# Patient Record
Sex: FEMALE | Race: WHITE | HISPANIC OR LATINO | URBAN - METROPOLITAN AREA
[De-identification: names, ages, dates, MRNs, and addresses within clinical notes are randomized per-mention and may not be internally consistent; named-entity substitution may affect disease eponyms.]

---

## 2023-03-15 ENCOUNTER — APPOINTMENT (RX ONLY)
Dept: URBAN - METROPOLITAN AREA CLINIC 114 | Facility: CLINIC | Age: 43
Setting detail: DERMATOLOGY
End: 2023-03-15

## 2023-03-15 DIAGNOSIS — L81.1 CHLOASMA: ICD-10-CM

## 2023-03-15 DIAGNOSIS — D22 MELANOCYTIC NEVI: ICD-10-CM

## 2023-03-15 DIAGNOSIS — L81.4 OTHER MELANIN HYPERPIGMENTATION: ICD-10-CM

## 2023-03-15 PROBLEM — D22.39 MELANOCYTIC NEVI OF OTHER PARTS OF FACE: Status: ACTIVE | Noted: 2023-03-15

## 2023-03-15 PROCEDURE — 99204 OFFICE O/P NEW MOD 45 MIN: CPT

## 2023-03-15 PROCEDURE — ? PRESCRIPTION

## 2023-03-15 PROCEDURE — ? PRESCRIPTION MEDICATION MANAGEMENT

## 2023-03-15 PROCEDURE — ? COUNSELING

## 2023-03-15 PROCEDURE — ? DEFER

## 2023-03-15 RX ORDER — HYDROQUINONE 6% 6 G/100G
1 EMULSION TOPICAL QD
Qty: 1 | Refills: 2 | COMMUNITY
Start: 2023-03-15

## 2023-03-15 RX ADMIN — HYDROQUINONE 6% 1: 6 EMULSION TOPICAL at 00:00

## 2023-03-15 ASSESSMENT — LOCATION DETAILED DESCRIPTION DERM
LOCATION DETAILED: LEFT CENTRAL MALAR CHEEK
LOCATION DETAILED: LEFT INFERIOR CENTRAL MALAR CHEEK
LOCATION DETAILED: RIGHT CENTRAL MALAR CHEEK
LOCATION DETAILED: RIGHT MEDIAL FOREHEAD
LOCATION DETAILED: RIGHT INFERIOR MEDIAL FOREHEAD
LOCATION DETAILED: RIGHT INFERIOR CENTRAL MALAR CHEEK

## 2023-03-15 ASSESSMENT — LOCATION SIMPLE DESCRIPTION DERM
LOCATION SIMPLE: RIGHT FOREHEAD
LOCATION SIMPLE: LEFT CHEEK
LOCATION SIMPLE: RIGHT CHEEK

## 2023-03-15 ASSESSMENT — LOCATION ZONE DERM: LOCATION ZONE: FACE

## 2023-03-15 NOTE — PROCEDURE: PRESCRIPTION MEDICATION MANAGEMENT
Detail Level: Detailed
Render In Strict Bullet Format?: No
Initiate Treatment: hydroquinone 6 % topical emulsion\\nSig: Apply once daily to the affected area of the face.  X 3 months

## 2023-03-15 NOTE — PROCEDURE: DEFER
Detail Level: Detailed
Size Of Lesion In Cm (Optional): 0
Scheduling Instructions (Optional): Referring out for consultation with Yahir Flores
Introduction Text (Please End With A Colon): Mark Choudhury

## 2023-03-16 ENCOUNTER — APPOINTMENT (RX ONLY)
Dept: URBAN - METROPOLITAN AREA CLINIC 333 | Facility: CLINIC | Age: 43
Setting detail: DERMATOLOGY
End: 2023-03-16

## 2023-03-16 DIAGNOSIS — Z41.9 ENCOUNTER FOR PROCEDURE FOR PURPOSES OTHER THAN REMEDYING HEALTH STATE, UNSPECIFIED: ICD-10-CM

## 2023-03-16 DIAGNOSIS — L81.1 CHLOASMA: ICD-10-CM | Status: INADEQUATELY CONTROLLED

## 2023-03-16 PROCEDURE — ? ADDITIONAL NOTES

## 2023-03-16 PROCEDURE — 99203 OFFICE O/P NEW LOW 30 MIN: CPT

## 2023-03-16 PROCEDURE — ? OTHER (COSMETIC)

## 2023-03-16 PROCEDURE — ? COUNSELING

## 2023-03-16 PROCEDURE — ? PRESCRIPTION MEDICATION MANAGEMENT

## 2023-03-16 ASSESSMENT — LOCATION ZONE DERM: LOCATION ZONE: FACE

## 2023-03-16 ASSESSMENT — LOCATION SIMPLE DESCRIPTION DERM: LOCATION SIMPLE: LEFT CHEEK

## 2023-03-16 ASSESSMENT — LOCATION DETAILED DESCRIPTION DERM: LOCATION DETAILED: LEFT INFERIOR CENTRAL MALAR CHEEK

## 2023-03-16 NOTE — PROCEDURE: PRESCRIPTION MEDICATION MANAGEMENT
Continue Regimen: Brightening pads (nightly)
Plan: Recheck 2 months
Detail Level: Zone
Render In Strict Bullet Format?: No
Initiate Treatment: Lytera (every morning)

## 2023-03-16 NOTE — PROCEDURE: ADDITIONAL NOTES
Render Risk Assessment In Note?: no
Detail Level: Simple
Additional Notes: Recommended #12 melasma emulsion , patient is already using brightening pads with hydroquinone per Elsa fitzgerald to continue to use \\nRecommended lytera every morning, also recommended SPF daily\\n\\nBrooke also recommended micro needling for pigmentation

## 2023-03-16 NOTE — HPI: DISCOLORATION (MELASMA)
Is This A New Presentation, Or A Follow-Up?: Discoloration
Additional History: \\n\\nPatient has had chemical peels which she feels made the spots worse.\\nPatient previously tried a pigment corrector (comping of hydroquinone mixed with 2 other meds)

## 2023-03-16 NOTE — PROCEDURE: OTHER (COSMETIC)
Detail Level: Zone
Other (Free Text): Spoke with guest about pigmentation on both cheeks. Patient had a chemical peel down in the past and had complications and stated she felt like her skin burned and that’s when pigmentation became present. \\nWe talked about doing combo therapy and switch between microneedling and vi peels.\\nBadriana recommended Lytera to be used every morning alongside her brightening wipes.\\n\\nPatient will come back in for her appointment with all of her products that she is currently using so we can be sure she is able to get peel done earlier than discussed.

## 2023-03-22 ENCOUNTER — APPOINTMENT (RX ONLY)
Dept: URBAN - METROPOLITAN AREA CLINIC 333 | Facility: CLINIC | Age: 43
Setting detail: DERMATOLOGY
End: 2023-03-22

## 2023-03-22 DIAGNOSIS — Z41.9 ENCOUNTER FOR PROCEDURE FOR PURPOSES OTHER THAN REMEDYING HEALTH STATE, UNSPECIFIED: ICD-10-CM

## 2023-03-22 PROCEDURE — ? VI PEEL

## 2023-03-22 NOTE — PROCEDURE: VI PEEL
Comments: Recommended package of 3 for $200 off\\nOr we can do hydra needling and get $200\\nWe will revisit at her next appointment

## 2023-03-22 NOTE — PROCEDURE: VI PEEL
Post-Care Instructions: Day One: Do not put anything on your skin for 4 hours (including sunscreen). \\n 4 hours after VI Peel (At _____________), apply Post Treatment Repair (in the post peel kit) over the peeled areas. \\nApply the SPF 50 over the Post Treatment Repair.\\n\\nNight One: 1 hour before bedtime\\n    Step 1: Cleanse skin with half of the  Vi Derm Gentle Cleanser. Avoid hot water. Gently pat skin dry.\\n    Step 2: Apply Precision Plus Towelette (White Towelette) to all areas of the skin. Use firm pressure.  DO NOT WASH OFF\\n    Step 3: Wait 30 minutes then apply Post Peel Towelette (Green Towelette) to all areas of the skin. Use firm pressure. DO NOT WASH OFF\\n    Step 4: Wait 10 min and then apply a thin layer of the Post Treatment Repair. \\n    Step 5: You are now ready for bed. Sweet Dreams!\\n      \\nDay Two: \\n    Step 1: Cleanse your skin with the other half of the Vi Derm Gentle Cleanser. Avoid hot water. Gently pat skin dry.\\n    Step 2: Apply a thin layer of Post Treatment Repair. Reapply throughout the day. (late morning, lunch and mid evening)\\n    Step 3: Apply Vi Derm SPF 50+ sunscreen. Reapply every couple of hours. Even if it is not candy outside!                                                                                                                                                  \\n\\nNight Two: 1 hour before bedtime\\n    Step 1: Cleanse your skin with half of the Vi Derm Gentle cleanser. Avoid hot water. Gently pat skin dry.\\n    Step 2:  Apply Precision Peel Towelette (White Towelette) to all areas of skin. DO NOT WASH OFF\\n    Step 3: Wait 30 minutes, then apply the Post Peel Towelette (Green Towelette).  DO NOT WASH OFF\\n    Step 4: Wait 10 min and then apply a thin layer of the Post Treatment Repair. \\n    Step 5: You are now ready for bed. Sweet Dreams!\\n        \\nDay Three:\\n    Today is when the peeling phase starts usually around the mouth area peeling outwards.\\n     Step 1: Cleanse with half of the Vi Derm gentle cleanser.\\n     Step 2: Apply thin layer of post treatment repair at least 3 times per day , more often if needed.\\n     Step 3: Apply  your Vi Derm SPF 50 and try to avoid the sun during the peeling phase. Reapply every 2 hours.\\n\\nDay Four - Seven\\n     You will wash your face morning and night with a gentle cleanser.\\n     You will continue using the Post Treatment Repair until you run out or until you come back to see me for your next appointment.\\n     Remember to wear Vi Derm SPF50 every day and you can reapply with our SPF Brush.\\n     You can wear make-up throughout the peeling process if you desire.\\n     After all peeling is complete you may resume your normal skin care regimen.\\n\\nPlease note the following:\\n? Never peel or force the skin off, you will scar.  Your skin will naturally slough off when washed.  You may take small scissors and trim the peeling skin.  Please be careful when doing so.\\n? If at anytime the Post Treatment Repair or sunscreen or a combination of both irritate your skin stop using them immediately and call our office at [phone]. Post-Care Instructions: Day One: Do not put anything on your skin for 4 hours (including sunscreen). \\n 4 hours after VI Peel (At _____________), apply Post Treatment Repair (in the post peel kit) over the peeled areas. \\nApply the SPF 50 over the Post Treatment Repair.\\n\\nNight One: 1 hour before bedtime\\n    Step 1: Cleanse skin with half of the  Vi Derm Gentle Cleanser. Avoid hot water. Gently pat skin dry.\\n    Step 2: Apply Precision Plus Towelette (White Towelette) to all areas of the skin. Use firm pressure.  DO NOT WASH OFF\\n    Step 3: Wait 30 minutes then apply Post Peel Towelette (Green Towelette) to all areas of the skin. Use firm pressure. DO NOT WASH OFF\\n    Step 4: Wait 10 min and then apply a thin layer of the Post Treatment Repair. \\n    Step 5: You are now ready for bed. Sweet Dreams!\\n      \\nDay Two: \\n    Step 1: Cleanse your skin with the other half of the Vi Derm Gentle Cleanser. Avoid hot water. Gently pat skin dry.\\n    Step 2: Apply a thin layer of Post Treatment Repair. Reapply throughout the day. (late morning, lunch and mid evening)\\n    Step 3: Apply Vi Derm SPF 50+ sunscreen. Reapply every couple of hours. Even if it is not candy outside!                                                                                                                                                  \\n\\nNight Two: 1 hour before bedtime\\n    Step 1: Cleanse your skin with half of the Vi Derm Gentle cleanser. Avoid hot water. Gently pat skin dry.\\n    Step 2:  Apply Precision Peel Towelette (White Towelette) to all areas of skin. DO NOT WASH OFF\\n    Step 3: Wait 30 minutes, then apply the Post Peel Towelette (Green Towelette).  DO NOT WASH OFF\\n    Step 4: Wait 10 min and then apply a thin layer of the Post Treatment Repair. \\n    Step 5: You are now ready for bed. Sweet Dreams!\\n        \\nDay Three:\\n    Today is when the peeling phase starts usually around the mouth area peeling outwards.\\n     Step 1: Cleanse with half of the Vi Derm gentle cleanser.\\n     Step 2: Apply thin layer of post treatment repair at least 3 times per day , more often if needed.\\n     Step 3: Apply  your Vi Derm SPF 50 and try to avoid the sun during the peeling phase. Reapply every 2 hours.\\n\\nDay Four - Seven\\n     You will wash your face morning and night with a gentle cleanser.\\n     You will continue using the Post Treatment Repair until you run out or until you come back to see me for your next appointment.\\n     Remember to wear Vi Derm SPF50 every day and you can reapply with our SPF Brush.\\n     You can wear make-up throughout the peeling process if you desire.\\n     After all peeling is complete you may resume your normal skin care regimen.\\n\\nPlease note the following:\\n? Never peel or force the skin off, you will scar.  Your skin will naturally slough off when washed.  You may take small scissors and trim the peeling skin.  Please be careful when doing so.\\n? If at anytime the Post Treatment Repair or sunscreen or a combination of both irritate your skin stop using them immediately and call our office at [phone].

## 2023-03-22 NOTE — PROCEDURE: VI PEEL
Price (Use Numbers Only, No Special Characters Or $): 211 Price (Use Numbers Only, No Special Characters Or $): 968

## 2023-09-26 ENCOUNTER — APPOINTMENT (RX ONLY)
Dept: URBAN - METROPOLITAN AREA CLINIC 333 | Facility: CLINIC | Age: 43
Setting detail: DERMATOLOGY
End: 2023-09-26

## 2023-09-26 DIAGNOSIS — Z41.9 ENCOUNTER FOR PROCEDURE FOR PURPOSES OTHER THAN REMEDYING HEALTH STATE, UNSPECIFIED: ICD-10-CM

## 2023-09-26 PROCEDURE — ? VI PEEL

## 2023-09-26 NOTE — PROCEDURE: VI PEEL
Post-Care Instructions: Day One: Do not put anything on your skin for 4 hours (including sunscreen). \\n 4 hours after VI Peel (At _____________), apply Post Treatment Repair (in the post peel kit) over the peeled areas. \\nApply the SPF 50 over the Post Treatment Repair.\\n\\nNight One: 1 hour before bedtime\\n    Step 1: Cleanse skin with half of the  Vi Derm Gentle Cleanser. Avoid hot water. Gently pat skin dry.\\n    Step 2: Apply Precision Plus Towelette (White Towelette) to all areas of the skin. Use firm pressure.  DO NOT WASH OFF\\n    Step 3: Wait 30 minutes then apply Post Peel Towelette (Green Towelette) to all areas of the skin. Use firm pressure. DO NOT WASH OFF\\n    Step 4: Wait 10 min and then apply a thin layer of the Post Treatment Repair. \\n    Step 5: You are now ready for bed. Sweet Dreams!\\n      \\nDay Two: \\n    Step 1: Cleanse your skin with the other half of the Vi Derm Gentle Cleanser. Avoid hot water. Gently pat skin dry.\\n    Step 2: Apply a thin layer of Post Treatment Repair. Reapply throughout the day. (late morning, lunch and mid evening)\\n    Step 3: Apply Vi Derm SPF 50+ sunscreen. Reapply every couple of hours. Even if it is not candy outside!                                                                                                                                                  \\n\\nNight Two: 1 hour before bedtime\\n    Step 1: Cleanse your skin with half of the Vi Derm Gentle cleanser. Avoid hot water. Gently pat skin dry.\\n    Step 2:  Apply Precision Peel Towelette (White Towelette) to all areas of skin. DO NOT WASH OFF\\n    Step 3: Wait 30 minutes, then apply the Post Peel Towelette (Green Towelette).  DO NOT WASH OFF\\n    Step 4: Wait 10 min and then apply a thin layer of the Post Treatment Repair. \\n    Step 5: You are now ready for bed. Sweet Dreams!\\n        \\nDay Three:\\n    Today is when the peeling phase starts usually around the mouth area peeling outwards.\\n     Step 1: Cleanse with half of the Vi Derm gentle cleanser.\\n     Step 2: Apply thin layer of post treatment repair at least 3 times per day , more often if needed.\\n     Step 3: Apply  your Vi Derm SPF 50 and try to avoid the sun during the peeling phase. Reapply every 2 hours.\\n\\nDay Four - Seven\\n     You will wash your face morning and night with a gentle cleanser.\\n     You will continue using the Post Treatment Repair until you run out or until you come back to see me for your next appointment.\\n     Remember to wear Vi Derm SPF50 every day and you can reapply with our SPF Brush.\\n     You can wear make-up throughout the peeling process if you desire.\\n     After all peeling is complete you may resume your normal skin care regimen.\\n\\nPlease note the following:\\n· Never peel or force the skin off, you will scar.  Your skin will naturally slough off when washed.  You may take small scissors and trim the peeling skin.  Please be careful when doing so.\\n· If at anytime the Post Treatment Repair or sunscreen or a combination of both irritate your skin stop using them immediately and call our office at [phone]. Post-Care Instructions: Day One: Do not put anything on your skin for 4 hours (including sunscreen). \\n 4 hours after VI Peel (At _____________), apply Post Treatment Repair (in the post peel kit) over the peeled areas. \\nApply the SPF 50 over the Post Treatment Repair.\\n\\nNight One: 1 hour before bedtime\\n    Step 1: Cleanse skin with half of the  Vi Derm Gentle Cleanser. Avoid hot water. Gently pat skin dry.\\n    Step 2: Apply Precision Plus Towelette (White Towelette) to all areas of the skin. Use firm pressure.  DO NOT WASH OFF\\n    Step 3: Wait 30 minutes then apply Post Peel Towelette (Green Towelette) to all areas of the skin. Use firm pressure. DO NOT WASH OFF\\n    Step 4: Wait 10 min and then apply a thin layer of the Post Treatment Repair. \\n    Step 5: You are now ready for bed. Sweet Dreams!\\n      \\nDay Two: \\n    Step 1: Cleanse your skin with the other half of the Vi Derm Gentle Cleanser. Avoid hot water. Gently pat skin dry.\\n    Step 2: Apply a thin layer of Post Treatment Repair. Reapply throughout the day. (late morning, lunch and mid evening)\\n    Step 3: Apply Vi Derm SPF 50+ sunscreen. Reapply every couple of hours. Even if it is not candy outside!                                                                                                                                                  \\n\\nNight Two: 1 hour before bedtime\\n    Step 1: Cleanse your skin with half of the Vi Derm Gentle cleanser. Avoid hot water. Gently pat skin dry.\\n    Step 2:  Apply Precision Peel Towelette (White Towelette) to all areas of skin. DO NOT WASH OFF\\n    Step 3: Wait 30 minutes, then apply the Post Peel Towelette (Green Towelette).  DO NOT WASH OFF\\n    Step 4: Wait 10 min and then apply a thin layer of the Post Treatment Repair. \\n    Step 5: You are now ready for bed. Sweet Dreams!\\n        \\nDay Three:\\n    Today is when the peeling phase starts usually around the mouth area peeling outwards.\\n     Step 1: Cleanse with half of the Vi Derm gentle cleanser.\\n     Step 2: Apply thin layer of post treatment repair at least 3 times per day , more often if needed.\\n     Step 3: Apply  your Vi Derm SPF 50 and try to avoid the sun during the peeling phase. Reapply every 2 hours.\\n\\nDay Four - Seven\\n     You will wash your face morning and night with a gentle cleanser.\\n     You will continue using the Post Treatment Repair until you run out or until you come back to see me for your next appointment.\\n     Remember to wear Vi Derm SPF50 every day and you can reapply with our SPF Brush.\\n     You can wear make-up throughout the peeling process if you desire.\\n     After all peeling is complete you may resume your normal skin care regimen.\\n\\nPlease note the following:\\n· Never peel or force the skin off, you will scar.  Your skin will naturally slough off when washed.  You may take small scissors and trim the peeling skin.  Please be careful when doing so.\\n· If at anytime the Post Treatment Repair or sunscreen or a combination of both irritate your skin stop using them immediately and call our office at [phone].

## 2023-09-26 NOTE — PROCEDURE: VI PEEL
Comments: Patient saw great improvement in her skin texture and color. Will do chemicals every few months to maintain.

## 2024-08-08 ENCOUNTER — DASHBOARD ENCOUNTERS (OUTPATIENT)
Age: 44
End: 2024-08-08

## 2024-08-08 ENCOUNTER — OFFICE VISIT (OUTPATIENT)
Dept: URBAN - METROPOLITAN AREA CLINIC 60 | Facility: CLINIC | Age: 44
End: 2024-08-08
Payer: COMMERCIAL

## 2024-08-08 VITALS
OXYGEN SATURATION: 95 % | SYSTOLIC BLOOD PRESSURE: 110 MMHG | DIASTOLIC BLOOD PRESSURE: 68 MMHG | WEIGHT: 178 LBS | HEIGHT: 64 IN | TEMPERATURE: 98.1 F | HEART RATE: 96 BPM | BODY MASS INDEX: 30.39 KG/M2

## 2024-08-08 DIAGNOSIS — K59.09 CHRONIC CONSTIPATION: ICD-10-CM

## 2024-08-08 DIAGNOSIS — D64.89 ANEMIA DUE TO OTHER CAUSE, NOT CLASSIFIED: ICD-10-CM

## 2024-08-08 DIAGNOSIS — R74.8 ELEVATED LIVER ENZYMES: ICD-10-CM

## 2024-08-08 DIAGNOSIS — Z80.0 FAMILY HISTORY OF COLON CANCER IN FATHER: ICD-10-CM

## 2024-08-08 PROCEDURE — 99204 OFFICE O/P NEW MOD 45 MIN: CPT

## 2024-08-08 RX ORDER — ASCORBIC ACID 250 MG
1 TABLET TABLET,CHEWABLE ORAL ONCE A DAY
Status: ACTIVE | COMMUNITY

## 2024-08-08 RX ORDER — FOLIC ACID 1 MG/1
TABLET ORAL
Qty: 14 TABLET | Status: ACTIVE | COMMUNITY

## 2024-08-08 RX ORDER — FERROUS SULFATE 325(65) MG
TABLET ORAL
Qty: 90 TABLET | Status: ACTIVE | COMMUNITY

## 2024-08-08 NOTE — HPI-TODAY'S VISIT:
This is a pleasant 43-year-old female presenting for anemia and elevated liver enzymes. Denies smoking.  Denies drinking alcohol.  Surgical history significant for , tummy tuck, breast implant, cholecystectomy/bile duct surgery.  She was also referred to hematology.  Today, she presents in good general state. She reports that her PCP found that she has iron deficiency anemia. She was referred to hematology and GI. She has not seen hematology yet. She was prescribed ferrous sulfate and Vitamin C and she has been taking it daily for the past 3 weeks. She had an iron transfusion on 24. She reports her last hemoglobin result was a few days ago and it is now 9.9. Unfortunately, we do not have these records. She reports that she has been having heavier menstrual cycles with blood clots since her COVID vaccine. She reports that her menstrual cycle this month was normal. She saw her gynecologist 3 months ago and was cleared from their standpoint. She does report that her mom has a history of iron deficiency anemia.   She also does report chronic constipation for many years. She reports one bowel movement every 2-3 days, [place] type 2-3. She does report that she has good water intake. Has tried fiber supplementation in the past with no efficacy.   She does relate that she has had elevated liver enzymes for the past 2 years. Denies alcohol use. Reports occasional Tylenol use.   Patient has abnormal liver function tests. Denies IV drug use, new medications, incarcerations, tattoos, family history of liver disease. Denies abdominal pain, abdominal distention, weight changes, yellowing of eyes/skin, fatigue, pruritus, bloody/black stool, confusion,  leg swelling.   Currently has no additional GI complaints, questions, concerns. Reports her father and maternal grandfather have a history of colon cancer. Denies melena, hematochezia, bright red blood per rectum, nausea/vomiting, hematemesis, dysphagia, reflux, abdominal pain, diarrhea/change in bowels, change in stool caliber, unintentional weight loss. Denies a history of stroke, heart attack, pacemaker, defibrillator, stents, blood thinners, COPD, asthma, sleep apnea, chronic kidney disease, diabetes, seizures.

## 2024-08-08 NOTE — HPI-PREVIOUS LABS
Labs 7/26/2024 - Lipid panel within normal limits with exception of elevated total cholesterol 241, elevated LDL of 147, elevated non-HDL of 169.   - CMP within normal limits with exception of elevated AST of 39, elevated ALT of 38. - Elevated hemoglobin A1c of 5.9. - Uric acid within normal limits. - TSH within normal limits. - CBC within normal limits with exception of low hemoglobin of 9.2, low hematocrit 30.8, low MCV of 78.6, low MCH of 23.5, low MCHC of 29.9, elevated RDW of 17.8. - Low vitamin D of 16.

## 2024-08-15 ENCOUNTER — LAB OUTSIDE AN ENCOUNTER (OUTPATIENT)
Dept: URBAN - METROPOLITAN AREA CLINIC 60 | Facility: CLINIC | Age: 44
End: 2024-08-15

## 2024-08-19 LAB
ACTIN (SMOOTH MUSCLE) ANTIBODY: 3
AFP, SERUM, TUMOR MARKER: 2.8
ALBUMIN: 4.4
ALKALINE PHOSPHATASE: 91
ALPHA-1-ANTITRYPSIN, SERUM: 133
ALT (SGPT): 32
ANA DIRECT: NEGATIVE
AST (SGOT): 33
BASO (ABSOLUTE): 0
BASOS: 0
BILIRUBIN, DIRECT: 0.13
BILIRUBIN, TOTAL: 0.6
BONE FRACTION:: 30
BUN/CREATININE RATIO: 24
BUN: 17
CALCIUM: 9.8
CARBON DIOXIDE, TOTAL: 24
CERULOPLASMIN: 34.2
CHLORIDE: 102
CREATININE: 0.71
CYTOMEGALOVIRUS (CMV) AB, IGM: <30
EGFR: 109
EOS (ABSOLUTE): 0
EOS: 1
FERRITIN, SERUM: 70
GGT: 51
GLOBULIN, TOTAL: 2.7
GLUCOSE: 93
HBSAG SCREEN: NEGATIVE
HCV AB: NON REACTIVE
HEMATOCRIT: 32.1
HEMATOLOGY COMMENTS:: (no result)
HEMOGLOBIN: 10.3
HEP A AB, TOTAL: POSITIVE
HEP B CORE AB, IGM: NEGATIVE
HEPATITIS C GENOTYPE: (no result)
HEREDITARY  HEMOCHROMATOSIS: (no result)
IMMATURE CELLS: (no result)
IMMATURE GRANS (ABS): 0
IMMATURE GRANULOCYTES: 0
IMMUNOGLOBULIN A, QN, SERUM: 175
IMMUNOGLOBULIN E, TOTAL: 5
IMMUNOGLOBULIN G, QN, SERUM: 1268
IMMUNOGLOBULIN M, QN, SERUM: 343
INR: 1
INTERPRETATION:: (no result)
INTESTINAL FRAC.:: 0
IRON BIND.CAP.(TIBC): 390
IRON SATURATION: 65
IRON: 253
LIVER FRACTION:: 70
LIVER-KIDNEY MICROSOMAL AB: 0.5
LYMPHS (ABSOLUTE): 2.5
LYMPHS: 40
Lab: (no result)
MCH: 25.8
MCHC: 32.1
MCV: 80
MITOCHONDRIAL (M2) ANTIBODY: <20
MONOCYTES(ABSOLUTE): 0.4
MONOCYTES: 6
NEUTROPHILS (ABSOLUTE): 3.4
NEUTROPHILS: 53
NRBC: (no result)
PHENOTYPE (PI): (no result)
PLATELETS: 335
POTASSIUM: 4.5
PROTEIN, TOTAL: 7.1
PROTHROMBIN TIME: 10.6
RBC: 4
RDW: 21.8
SODIUM: 138
UIBC: 137
WBC: 6.3

## 2024-08-20 ENCOUNTER — TELEPHONE ENCOUNTER (OUTPATIENT)
Dept: URBAN - METROPOLITAN AREA CLINIC 60 | Facility: CLINIC | Age: 44
End: 2024-08-20